# Patient Record
Sex: MALE | Race: OTHER | HISPANIC OR LATINO | Employment: UNEMPLOYED | URBAN - METROPOLITAN AREA
[De-identification: names, ages, dates, MRNs, and addresses within clinical notes are randomized per-mention and may not be internally consistent; named-entity substitution may affect disease eponyms.]

---

## 2023-03-29 ENCOUNTER — HOSPITAL ENCOUNTER (EMERGENCY)
Facility: HOSPITAL | Age: 33
Discharge: HOME | End: 2023-03-29
Attending: STUDENT IN AN ORGANIZED HEALTH CARE EDUCATION/TRAINING PROGRAM

## 2023-03-29 VITALS
TEMPERATURE: 98 F | HEIGHT: 65 IN | BODY MASS INDEX: 23.88 KG/M2 | WEIGHT: 143.31 LBS | HEART RATE: 67 BPM | DIASTOLIC BLOOD PRESSURE: 92 MMHG | RESPIRATION RATE: 20 BRPM | OXYGEN SATURATION: 100 % | SYSTOLIC BLOOD PRESSURE: 169 MMHG
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
## 2023-03-29 DIAGNOSIS — S20.211A RIB CONTUSION, RIGHT, INITIAL ENCOUNTER: Primary | ICD-10-CM

## 2023-03-29 LAB
ANION GAP SERPL CALC-SCNC: 7 MEQ/L
BASOPHILS # BLD AUTO: 0.05 X10(3)/MCL (ref 0–0.2)
BASOPHILS NFR BLD AUTO: 0.5 %
BUN SERPL-MCNC: 17.6 MG/DL (ref 8.9–20.6)
CALCIUM SERPL-MCNC: 9.8 MG/DL (ref 8.4–10.2)
CHLORIDE SERPL-SCNC: 108 MMOL/L (ref 98–107)
CO2 SERPL-SCNC: 26 MMOL/L (ref 22–29)
CREAT SERPL-MCNC: 0.88 MG/DL (ref 0.73–1.18)
CREAT/UREA NIT SERPL: 20
EOSINOPHIL # BLD AUTO: 0.12 X10(3)/MCL (ref 0–0.9)
EOSINOPHIL NFR BLD AUTO: 1.3 %
ERYTHROCYTE [DISTWIDTH] IN BLOOD BY AUTOMATED COUNT: 13.3 % (ref 11.5–17)
GFR SERPLBLD CREATININE-BSD FMLA CKD-EPI: >60 MLS/MIN/1.73/M2
GLUCOSE SERPL-MCNC: 92 MG/DL (ref 74–100)
HCT VFR BLD AUTO: 45.9 % (ref 42–52)
HGB BLD-MCNC: 15.3 G/DL (ref 14–18)
IMM GRANULOCYTES # BLD AUTO: 0.03 X10(3)/MCL (ref 0–0.04)
IMM GRANULOCYTES NFR BLD AUTO: 0.3 %
LYMPHOCYTES # BLD AUTO: 3.05 X10(3)/MCL (ref 0.6–4.6)
LYMPHOCYTES NFR BLD AUTO: 32.6 %
MCH RBC QN AUTO: 29.5 PG (ref 27–31)
MCHC RBC AUTO-ENTMCNC: 33.3 G/DL (ref 33–36)
MCV RBC AUTO: 88.4 FL (ref 80–94)
MONOCYTES # BLD AUTO: 0.62 X10(3)/MCL (ref 0.1–1.3)
MONOCYTES NFR BLD AUTO: 6.6 %
NEUTROPHILS # BLD AUTO: 5.48 X10(3)/MCL (ref 2.1–9.2)
NEUTROPHILS NFR BLD AUTO: 58.7 %
NRBC BLD AUTO-RTO: 0 %
PLATELET # BLD AUTO: 317 X10(3)/MCL (ref 130–400)
PMV BLD AUTO: 9.3 FL (ref 7.4–10.4)
POTASSIUM SERPL-SCNC: 4.2 MMOL/L (ref 3.5–5.1)
RBC # BLD AUTO: 5.19 X10(6)/MCL (ref 4.7–6.1)
SODIUM SERPL-SCNC: 141 MMOL/L (ref 136–145)
WBC # SPEC AUTO: 9.4 X10(3)/MCL (ref 4.5–11.5)

## 2023-03-29 PROCEDURE — 63600175 PHARM REV CODE 636 W HCPCS: Performed by: PHYSICIAN ASSISTANT

## 2023-03-29 PROCEDURE — 96372 THER/PROPH/DIAG INJ SC/IM: CPT | Performed by: PHYSICIAN ASSISTANT

## 2023-03-29 PROCEDURE — 99284 EMERGENCY DEPT VISIT MOD MDM: CPT | Mod: 25

## 2023-03-29 PROCEDURE — 25000003 PHARM REV CODE 250: Performed by: PHYSICIAN ASSISTANT

## 2023-03-29 PROCEDURE — 80048 BASIC METABOLIC PNL TOTAL CA: CPT | Performed by: PHYSICIAN ASSISTANT

## 2023-03-29 PROCEDURE — 85025 COMPLETE CBC W/AUTO DIFF WBC: CPT | Performed by: PHYSICIAN ASSISTANT

## 2023-03-29 RX ORDER — HYDROCODONE BITARTRATE AND ACETAMINOPHEN 5; 325 MG/1; MG/1
1 TABLET ORAL ONCE
Status: COMPLETED | OUTPATIENT
Start: 2023-03-29 | End: 2023-03-29

## 2023-03-29 RX ORDER — INDOMETHACIN 25 MG/1
25 CAPSULE ORAL
Qty: 15 CAPSULE | Refills: 0 | Status: SHIPPED | OUTPATIENT
Start: 2023-03-29 | End: 2023-04-03

## 2023-03-29 RX ORDER — KETOROLAC TROMETHAMINE 30 MG/ML
30 INJECTION, SOLUTION INTRAMUSCULAR; INTRAVENOUS
Status: COMPLETED | OUTPATIENT
Start: 2023-03-29 | End: 2023-03-29

## 2023-03-29 RX ORDER — METHOCARBAMOL 500 MG/1
500 TABLET, FILM COATED ORAL 3 TIMES DAILY
Qty: 15 TABLET | Refills: 0 | Status: SHIPPED | OUTPATIENT
Start: 2023-03-29 | End: 2023-04-03

## 2023-03-29 RX ADMIN — KETOROLAC TROMETHAMINE 30 MG: 30 INJECTION, SOLUTION INTRAMUSCULAR at 03:03

## 2023-03-29 RX ADMIN — HYDROCODONE BITARTRATE AND ACETAMINOPHEN 1 TABLET: 5; 325 TABLET ORAL at 03:03

## 2023-03-29 NOTE — ED PROVIDER NOTES
Encounter Date: 3/29/2023       History     Chief Complaint   Patient presents with    Rib Injury     Pain to R ribs after falling while working on Saturday     Patient reports to the ER with complaints of right sided rib pain after he fell onto a wood board from a standing position; pt denies LOC or striking any other object    The history is provided by the patient.   Fall  The accident occurred two days ago. The fall occurred while standing. He fell from a height of 1 to 2 ft. 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 The pain is at a severity of 5/10. He was Ambulatory at the scene. There was No entrapment after the fall. There was No alcohol use involved in the accident. Pertinent negatives include no neck pain, no back pain, no paralysis, no fever, no numbness, no abdominal pain, no nausea, no hematuria, no headaches, no hearing loss and no loss of consciousness. He has tried rest for the symptoms. The treatment provided mild relief.   Review of patient's allergies indicates:  No Known Allergies  History reviewed. No pertinent past medical history.  History reviewed. No pertinent surgical history.  History reviewed. No pertinent family history.  Social History     Tobacco Use    Smoking status: Never    Smokeless tobacco: Never   Substance Use Topics    Alcohol use: Never    Drug use: Never     Review of Systems   Constitutional:  Negative for fever.   HENT:  Negative for sore throat.    Respiratory:  Negative for shortness of breath.    Cardiovascular:  Negative for leg swelling.   Gastrointestinal:  Negative for abdominal pain and nausea.   Genitourinary:  Negative for dysuria and hematuria.   Musculoskeletal:  Negative for back pain and neck pain.   Skin:  Negative for rash.   Neurological:  Negative for loss of consciousness, weakness, numbness and headaches.   Hematological:  Does not bruise/bleed easily.   Psychiatric/Behavioral: Negative.       Physical Exam     Initial Vitals [03/29/23 1343]    BP Pulse Resp Temp SpO2   (!) 168/90 60 17 98.1 °F (36.7 °C) 100 %      MAP       --         Physical Exam    Vitals reviewed.  Constitutional: He appears well-developed.   HENT:   Head: Normocephalic and atraumatic.   Eyes: Conjunctivae and EOM are normal. Pupils are equal, round, and reactive to light.   Neck:   Normal range of motion.  Cardiovascular:  Normal rate, regular rhythm and normal heart sounds.           Pulmonary/Chest: Breath sounds normal. No respiratory distress. He has no wheezes. He exhibits tenderness.     Abdominal: Abdomen is soft. Bowel sounds are normal. He exhibits no distension. There is no abdominal tenderness.   Musculoskeletal:         General: Normal range of motion.      Cervical back: Normal range of motion.     Neurological: He is alert and oriented to person, place, and time. He displays normal reflexes. No cranial nerve deficit or sensory deficit. GCS score is 15. GCS eye subscore is 4. GCS verbal subscore is 5. GCS motor subscore is 6.   Skin: Skin is warm. No pallor.   Psychiatric: He has a normal mood and affect. His behavior is normal. Judgment and thought content normal.       ED Course   Procedures  Labs Reviewed   BASIC METABOLIC PANEL - Abnormal; Notable for the following components:       Result Value    Chloride 108 (*)     All other components within normal limits   CBC W/ AUTO DIFFERENTIAL    Narrative:     The following orders were created for panel order CBC auto differential.  Procedure                               Abnormality         Status                     ---------                               -----------         ------                     CBC with Differential[[id number]]                            Final result                 Please view results for these tests on the individual orders.   CBC WITH DIFFERENTIAL   EXTRA TUBES    Narrative:     The following orders were created for panel order EXTRA TUBES.  Procedure                               Abnormality          Status                     ---------                               -----------         ------                     Light Blue Top Hold[[id number]]                              In process                 Gold Top Hold[[id number]]                                    In process                   Please view results for these tests on the individual orders.   LIGHT BLUE TOP HOLD   GOLD TOP HOLD          Imaging Results              X-Ray Chest PA And Lateral (Final result)  Result time 03/29/23 14:55:02      Final result by Sonu Hua MD (03/29/23 14:55:02)                   Impression:      No acute chest disease is identified.      Electronically signed by: Sonu Hua  Date:    03/29/2023  Time:    14:55               Narrative:    EXAMINATION:  XR CHEST PA AND LATERAL    CLINICAL HISTORY:  , Unspecified fall, initial encounter.    FINDINGS:  No alveolar consolidation, effusion, or pneumothorax is seen.   The thoracic aorta is normal  cardiac silhouette, central pulmonary vessels and mediastinum are normal in size and are grossly unremarkable.   visualized osseous structures are grossly unremarkable.                                       X-Ray Ribs 2 View Right (Final result)  Result time 03/29/23 15:14:17      Final result by Jackie Hines MD (03/29/23 15:14:17)                   Impression:      No displaced rib fracture identified.      Electronically signed by: Jackie Hines  Date:    03/29/2023  Time:    15:14               Narrative:    EXAMINATION:  XR RIBS 2 VIEW RIGHT    CLINICAL HISTORY:  Unspecified fall, initial encounter    COMPARISON:  Chest x-ray dated 03/29/2023    FINDINGS:  There is no displaced rib fracture identified.  The right lung is clear without pleural effusion or visible pneumothorax.                                       Medications   ketorolac injection 30 mg (30 mg Intramuscular Given 3/29/23 1500)   HYDROcodone-acetaminophen 5-325 mg per tablet 1 tablet (1  tablet Oral Given 3/29/23 1500)           APC / Resident Notes:   I was not physically present during the history, exam or disposition of this patient.  I was available all times for consultation. (Jonathon)                    Clinical Impression:   Final diagnoses:  [W19.XXXA] Fall  [W19.XXXA] Fall - onto right ribs  [S20.211A] Rib contusion, right, initial encounter (Primary)        ED Disposition Condition    Discharge Stable          ED Prescriptions       Medication Sig Dispense Start Date End Date Auth. Provider    methocarbamoL (ROBAXIN) 500 MG Tab Take 1 tablet (500 mg total) by mouth 3 (three) times daily. for 5 days 15 tablet 3/29/2023 4/3/2023 MELLY Montes    indomethacin (INDOCIN) 25 MG capsule Take 1 capsule (25 mg total) by mouth 3 (three) times daily with meals. for 5 days 15 capsule 3/29/2023 4/3/2023 MELLY Montes          Follow-up Information       Follow up With Specialties Details Why Contact Info    discharge followup    If your symptoms become WORSE or you DO NOT IMPROVE and you are unable to reach your health care provider, you should RETURN to the emergency department    discharge info    Discussed all pertinent ED information, results, diagnosis and treatment plan; All questions and concerns were addressed at this time. Patient voices understanding of information and instructions. Patient is comfortable with plan and discharge             MELLY Montes  03/29/23 5606       Aman Holt MD  03/30/23 8679

## 2025-03-08 ENCOUNTER — HOSPITAL ENCOUNTER (EMERGENCY)
Facility: HOSPITAL | Age: 35
Discharge: HOME | End: 2025-03-08
Attending: EMERGENCY MEDICINE

## 2025-03-08 VITALS
OXYGEN SATURATION: 98 % | HEIGHT: 66 IN | DIASTOLIC BLOOD PRESSURE: 79 MMHG | TEMPERATURE: 99 F | BODY MASS INDEX: 27 KG/M2 | RESPIRATION RATE: 14 BRPM | SYSTOLIC BLOOD PRESSURE: 125 MMHG | HEART RATE: 77 BPM | WEIGHT: 168 LBS

## 2025-03-08 DIAGNOSIS — R05.9 COUGH: ICD-10-CM

## 2025-03-08 DIAGNOSIS — J10.1 INFLUENZA A: Primary | ICD-10-CM

## 2025-03-08 LAB
FLUAV AG UPPER RESP QL IA.RAPID: DETECTED
FLUBV AG UPPER RESP QL IA.RAPID: NOT DETECTED
RSV A 5' UTR RNA NPH QL NAA+PROBE: NOT DETECTED
SARS-COV-2 RNA RESP QL NAA+PROBE: NOT DETECTED
STREP A PCR (OHS): NOT DETECTED

## 2025-03-08 PROCEDURE — 87651 STREP A DNA AMP PROBE: CPT

## 2025-03-08 PROCEDURE — 25000003 PHARM REV CODE 250

## 2025-03-08 PROCEDURE — 99283 EMERGENCY DEPT VISIT LOW MDM: CPT | Mod: 25

## 2025-03-08 PROCEDURE — 0241U COVID/RSV/FLU A&B PCR: CPT

## 2025-03-08 RX ORDER — FLUTICASONE PROPIONATE 50 MCG
1 SPRAY, SUSPENSION (ML) NASAL 2 TIMES DAILY PRN
Qty: 15 G | Refills: 0 | Status: SHIPPED | OUTPATIENT
Start: 2025-03-08

## 2025-03-08 RX ORDER — ACETAMINOPHEN 325 MG/1
650 TABLET ORAL
Status: COMPLETED | OUTPATIENT
Start: 2025-03-08 | End: 2025-03-08

## 2025-03-08 RX ORDER — LORATADINE 10 MG/1
10 TABLET ORAL DAILY PRN
Qty: 20 TABLET | Refills: 0 | Status: SHIPPED | OUTPATIENT
Start: 2025-03-08

## 2025-03-08 RX ORDER — BENZONATATE 100 MG/1
100 CAPSULE ORAL 3 TIMES DAILY PRN
Qty: 15 CAPSULE | Refills: 0 | Status: SHIPPED | OUTPATIENT
Start: 2025-03-08

## 2025-03-08 RX ORDER — IBUPROFEN 600 MG/1
600 TABLET ORAL EVERY 6 HOURS PRN
Qty: 20 TABLET | Refills: 0 | Status: SHIPPED | OUTPATIENT
Start: 2025-03-08

## 2025-03-08 RX ADMIN — ACETAMINOPHEN 650 MG: 325 TABLET, FILM COATED ORAL at 09:03

## 2025-03-08 NOTE — ED PROVIDER NOTES
Encounter Date: 3/8/2025       History     Chief Complaint   Patient presents with    Generalized Body Aches     Generalized body aches, sore throat, chills, productive cough x 3 days.      35 y.o. male presents to Emergency Department with a chief complaint of cough. Symptoms began 3-4 days ago and have been constant since onset. Associated symptoms include subjective fever, body aches, chills, congestion, and sore throat. Symptoms are aggravated with coughing and there are no alleviating factors. The patient denies CP, SOB, vomiting, or abdominal pain. No other reported symptoms at this time      The history is provided by the patient and a relative. A  was used.   Cough  This is a new problem. The current episode started several days ago. The problem occurs every few minutes. The problem has been unchanged. The cough is Productive of sputum. The fever has been present for 3 to 4 days. Associated symptoms include chills, sore throat and myalgias. Pertinent negatives include no chest pain, no sweats, no headaches, no shortness of breath and no wheezing. He has tried nothing for the symptoms.     Review of patient's allergies indicates:  No Known Allergies  History reviewed. No pertinent past medical history.  History reviewed. No pertinent surgical history.  No family history on file.  Social History[1]  Review of Systems   Constitutional:  Positive for chills and fever. Negative for diaphoresis and fatigue.   HENT:  Positive for congestion and sore throat. Negative for facial swelling, hearing loss, mouth sores, trouble swallowing and voice change.    Eyes:  Negative for photophobia and visual disturbance.   Respiratory:  Positive for cough. Negative for shortness of breath, wheezing and stridor.    Cardiovascular:  Negative for chest pain and leg swelling.   Gastrointestinal:  Negative for abdominal pain, diarrhea, nausea and vomiting.   Musculoskeletal:  Positive for myalgias. Negative for  back pain, gait problem and joint swelling.   Neurological:  Negative for dizziness, seizures, syncope, weakness, numbness and headaches.   All other systems reviewed and are negative.      Physical Exam     Initial Vitals [03/08/25 0940]   BP Pulse Resp Temp SpO2   131/82 78 15 99.8 °F (37.7 °C) 97 %      MAP       --         Physical Exam    Nursing note and vitals reviewed.  Constitutional: He appears well-developed and well-nourished. He is not diaphoretic. He is cooperative.  Non-toxic appearance. No distress.   HENT:   Head: Normocephalic and atraumatic.   Right Ear: Hearing, tympanic membrane, external ear and ear canal normal. No tenderness. Tympanic membrane is not erythematous.   Left Ear: Hearing, tympanic membrane, external ear and ear canal normal. No tenderness. Tympanic membrane is not erythematous.   Nose: Nose normal. Mouth/Throat: Uvula is midline. Posterior oropharyngeal erythema present. No oropharyngeal exudate or tonsillar abscesses.   1 + symmetric tonsils noted.   Eyes: Conjunctivae and EOM are normal. Pupils are equal, round, and reactive to light.   Neck: Neck supple.   Normal range of motion.  Cardiovascular:  Normal rate, regular rhythm, S1 normal, S2 normal, normal heart sounds, intact distal pulses and normal pulses.           Pulmonary/Chest: Effort normal and breath sounds normal. No accessory muscle usage. No tachypnea and no bradypnea. No respiratory distress. He has no decreased breath sounds. He has no wheezes. He has no rhonchi. He has no rales. He exhibits no tenderness.   In no respiratory distress, able to speak in complete sentences.    Abdominal: Abdomen is soft. Bowel sounds are normal. He exhibits no distension. There is no abdominal tenderness. There is no rebound.   Musculoskeletal:         General: Tenderness present. Normal range of motion.      Cervical back: Normal range of motion and neck supple.      Comments: C/o generalized body aches.      Neurological: He is  alert and oriented to person, place, and time. He has normal strength and normal reflexes. No sensory deficit. GCS score is 15. GCS eye subscore is 4. GCS verbal subscore is 5. GCS motor subscore is 6.   Skin: Skin is warm and dry. Capillary refill takes less than 2 seconds.   Psychiatric: He has a normal mood and affect. Thought content normal.         ED Course   Procedures  Labs Reviewed   COVID/RSV/FLU A&B PCR - Abnormal       Result Value    Influenza A PCR Detected (*)     Influenza B PCR Not Detected      Respiratory Syncytial Virus PCR Not Detected      SARS-CoV-2 PCR Not Detected      Narrative:     The Xpert Xpress SARS-CoV-2/FLU/RSV plus is a rapid, multiplexed real-time PCR test intended for the simultaneous qualitative detection and differentiation of SARS-CoV-2, Influenza A, Influenza B, and respiratory syncytial virus (RSV) viral RNA in either nasopharyngeal swab or nasal swab specimens.         STREP GROUP A BY PCR - Normal    STREP A PCR ([facility]) Not Detected      Narrative:     The Xpert Xpress Strep A test is a rapid, qualitative in vitro diagnostic test for the detection of Streptococcus pyogenes (Group A ß-hemolytic Streptococcus, Strep A) in throat swab specimens from patients with signs and symptoms of pharyngitis.            Imaging Results              X-Ray Chest PA And Lateral (Final result)  Result time 03/08/25 10:09:39      Final result by Emir Meléndez MD (03/08/25 10:09:39)                   Impression:      No acute cardiopulmonary process identified.      Electronically signed by: Emir Meléndez  Date:    03/08/2025  Time:    10:09               Narrative:    EXAMINATION:  XR CHEST PA AND LATERAL    CLINICAL HISTORY:  Cough, unspecified    TECHNIQUE:  Two-view    COMPARISON:  March 29, 2023.    FINDINGS:  Cardiopericardial silhouette is within normal limits. Lungs are without dense focal or segmental consolidation, congestion, pleural effusion or pneumothorax.                                        Medications   acetaminophen tablet 650 mg (650 mg Oral Given 3/8/25 0956)     Medical Decision Making  Patient awake, alert, has non-labored breathing, and follows commands appropriately. Arrived to ED due to cough, fever, body aches, sore throat, and chills. Symptoms began several days ago. Low grade temp noted in triage. NAD Noted.    Judging by the patient's chief complaint and pertinent history, the patient has the following possible differential diagnoses, including but not limited to the following: COVID, Flu, Viral Syndrome, Strep, Bronchitis     Some of these are deemed to be lower likelihood and some more likely based on my physical exam and history combined with possible lab work and/or imaging studies. Please see the pertinent studies, and refer to the HPI. Some of these diagnoses will take further evaluation to fully rule out, perhaps as an outpatient and the patient was encouraged to follow up when discharged for more comprehensive evaluation.       Amount and/or Complexity of Data Reviewed  Labs: ordered. Decision-making details documented in ED Course.     Details: Flu +. Informed patient and family of results.   Radiology: ordered. Decision-making details documented in ED Course.     Details: Informed patient and family of results.   Discussion of management or test interpretation with external provider(s): Discussed plan of care and interventions with patient. Agreed to and aware of plan of care. Comfortable being discharged home. Patient discharged home. Patient denies new or additional complaints; no further tests indicated at this time. Verbalized understanding of instructions. No emergent or apparent distress noted prior to discharge.. Strict ER return precautions given.       Risk  OTC drugs.  Prescription drug management.               ED Course as of 03/08/25 1115   Sat Mar 08, 2025   1012 X-Ray Chest PA And Lateral  Cardiopericardial silhouette is within normal limits. Lungs  are without dense focal or segmental consolidation, congestion, pleural effusion or pneumothorax. [JA]   1030 STREP A PCR ([facility]): Not Detected [JA]   1046 Influenza A, Molecular(!): Detected [JA]   1046 Influenza B, Molecular: Not Detected [JA]   1046 RSV Ag by Molecular Method: Not Detected [JA]   1046 SARS-CoV2 (COVID-19) Qualitative PCR: Not Detected [JA]   1057 Discussed results with patient and family. Flu +. Symptoms began several days ago, therefore, Tamiflu not currently indicated. Will treat symptoms with medication. At disposition, patient has no additional complaints, VSS, has no signs of systemic infection, and no respiratory distress. Stable for dc home.  [JA]      ED Course User Index  [JA] Julia Stringer NP                           Clinical Impression:  Final diagnoses:  [R05.9] Cough  [J10.1] Influenza A (Primary)          ED Disposition Condition    Discharge Stable          ED Prescriptions       Medication Sig Dispense Start Date End Date Auth. Provider    ibuprofen (ADVIL,MOTRIN) 600 MG tablet Take 1 tablet (600 mg total) by mouth every 6 (six) hours as needed for Pain. 20 tablet 3/8/2025 -- Julia Stringer NP    benzonatate (TESSALON) 100 MG capsule Take 1 capsule (100 mg total) by mouth 3 (three) times daily as needed for Cough. 15 capsule 3/8/2025 -- Julia Stringer NP    fluticasone propionate (FLONASE) 50 mcg/actuation nasal spray 1 spray (50 mcg total) by Each Nostril route 2 (two) times daily as needed for Rhinitis or Allergies. 15 g 3/8/2025 -- Julia Stringer NP    loratadine (CLARITIN) 10 mg tablet Take 1 tablet (10 mg total) by mouth daily as needed for Allergies. 20 tablet 3/8/2025 -- Julia Stringer NP          Follow-up Information       Follow up With Specialties Details Why Contact Info    PCP  Schedule an appointment as soon as possible for a visit in 2 days      [facility] Emergency Medicine Go to  If symptoms worsen, As  needed [address] [facility] [address]  [phone]               [1]   Social History  Tobacco Use    Smoking status: Never    Smokeless tobacco: Never   Substance Use Topics    Alcohol use: Never    Drug use: Never        Julia Stringer, NP  03/08/25 1120

## 2025-03-08 NOTE — DISCHARGE INSTRUCTIONS
¡Fariba por permitirnos cuidarlo hoy! Nuestro objetivo es brindarle geoffrey atención len y mantenerlo cómodo e informado. Si tiene alguna pregunta antes de irse, estaré encantado de intentar responderla.    A continuación, le ofrecemos algunos consejos para después de rainey visita:    Rainey visita al departamento de emergencias NO es geoffrey atención definitiva. Realice un seguimiento con rainey médico de atención primaria o especialista dentro de 1 semana. Vuelva si nota algún empeoramiento de rainey condición o si tiene alguna otra inquietud.    Si le realizaron exámenes de radiología norberto geoffrey radiografía o geoffrey tomografía computarizada en el departamento de emergencias, la interpretación de los mismos puede ser preliminar. Es posible que haya hallazgos menos urgentes en los informes. Obtenga estos informes dentro de las 24 horas en el hospital o usando rainey teléfono móvil para acceder a los registros. Llévelos a rainey médico de atención primaria o especialista para geoffrey revisión adicional de los hallazgos incidentales.    Revise cualquier ANÁLISIS DE LABORATORIO de rainey visita de horamses con rainey médico de atención primaria.

## 2025-04-12 PROBLEM — F17.200 TOBACCO DEPENDENCY: Status: ACTIVE | Noted: 2025-04-12

## 2025-04-12 PROBLEM — S37.009A KIDNEY INJURY: Status: ACTIVE | Noted: 2025-04-12

## 2025-04-15 ENCOUNTER — HOSPITAL ENCOUNTER (EMERGENCY)
Facility: HOSPITAL | Age: 35
Discharge: HOME | End: 2025-04-15
Attending: STUDENT IN AN ORGANIZED HEALTH CARE EDUCATION/TRAINING PROGRAM

## 2025-04-15 VITALS
HEIGHT: 66 IN | OXYGEN SATURATION: 99 % | TEMPERATURE: 98 F | BODY MASS INDEX: 27 KG/M2 | SYSTOLIC BLOOD PRESSURE: 134 MMHG | RESPIRATION RATE: 20 BRPM | HEART RATE: 54 BPM | WEIGHT: 168 LBS | DIASTOLIC BLOOD PRESSURE: 80 MMHG

## 2025-04-15 DIAGNOSIS — R10.9 SIDE PAIN: ICD-10-CM

## 2025-04-15 DIAGNOSIS — R06.02 SHORTNESS OF BREATH: ICD-10-CM

## 2025-04-15 DIAGNOSIS — R50.9 FEVER, UNSPECIFIED FEVER CAUSE: Primary | ICD-10-CM

## 2025-04-15 PROBLEM — S37.031D: Status: ACTIVE | Noted: 2025-04-15

## 2025-04-15 LAB
ALBUMIN SERPL-MCNC: 4.4 G/DL (ref 3.5–5)
ALBUMIN/GLOB SERPL: 1.3 RATIO (ref 1.1–2)
ALP SERPL-CCNC: 82 UNIT/L (ref 40–150)
ALT SERPL-CCNC: 27 UNIT/L (ref 0–55)
ANION GAP SERPL CALC-SCNC: 6 MEQ/L
AST SERPL-CCNC: 18 UNIT/L (ref 11–45)
BACTERIA #/AREA URNS AUTO: ABNORMAL /HPF
BASOPHILS # BLD AUTO: 0.05 X10(3)/MCL
BASOPHILS NFR BLD AUTO: 0.4 %
BILIRUB SERPL-MCNC: 0.3 MG/DL
BILIRUB UR QL STRIP.AUTO: NEGATIVE
BNP BLD-MCNC: <10 PG/ML
BUN SERPL-MCNC: 15.3 MG/DL (ref 8.9–20.6)
CALCIUM SERPL-MCNC: 9.3 MG/DL (ref 8.4–10.2)
CHLORIDE SERPL-SCNC: 105 MMOL/L (ref 98–107)
CLARITY UR: CLEAR
CO2 SERPL-SCNC: 24 MMOL/L (ref 22–29)
COLOR UR AUTO: ABNORMAL
CREAT SERPL-MCNC: 0.8 MG/DL (ref 0.72–1.25)
CREAT/UREA NIT SERPL: 19
EOSINOPHIL # BLD AUTO: 0.04 X10(3)/MCL (ref 0–0.9)
EOSINOPHIL NFR BLD AUTO: 0.3 %
ERYTHROCYTE [DISTWIDTH] IN BLOOD BY AUTOMATED COUNT: 13.9 % (ref 11.5–17)
FLUAV AG UPPER RESP QL IA.RAPID: NOT DETECTED
FLUBV AG UPPER RESP QL IA.RAPID: NOT DETECTED
GFR SERPLBLD CREATININE-BSD FMLA CKD-EPI: >60 ML/MIN/1.73/M2
GLOBULIN SER-MCNC: 3.4 GM/DL (ref 2.4–3.5)
GLUCOSE SERPL-MCNC: 91 MG/DL (ref 74–100)
GLUCOSE UR QL STRIP: NORMAL
HCT VFR BLD AUTO: 46.3 % (ref 42–52)
HGB BLD-MCNC: 15.1 G/DL (ref 14–18)
HGB UR QL STRIP: ABNORMAL
IMM GRANULOCYTES # BLD AUTO: 0.03 X10(3)/MCL (ref 0–0.04)
IMM GRANULOCYTES NFR BLD AUTO: 0.2 %
KETONES UR QL STRIP: NEGATIVE
LEUKOCYTE ESTERASE UR QL STRIP: NEGATIVE
LYMPHOCYTES # BLD AUTO: 2.09 X10(3)/MCL (ref 0.6–4.6)
LYMPHOCYTES NFR BLD AUTO: 17 %
MCH RBC QN AUTO: 29.2 PG (ref 27–31)
MCHC RBC AUTO-ENTMCNC: 32.6 G/DL (ref 33–36)
MCV RBC AUTO: 89.4 FL (ref 80–94)
MONOCYTES # BLD AUTO: 0.93 X10(3)/MCL (ref 0.1–1.3)
MONOCYTES NFR BLD AUTO: 7.6 %
MUCOUS THREADS URNS QL MICRO: ABNORMAL /LPF
NEUTROPHILS # BLD AUTO: 9.17 X10(3)/MCL (ref 2.1–9.2)
NEUTROPHILS NFR BLD AUTO: 74.5 %
NITRITE UR QL STRIP: NEGATIVE
NRBC BLD AUTO-RTO: 0 %
OHS QRS DURATION: 90 MS
OHS QTC CALCULATION: 385 MS
PH UR STRIP: 7.5 [PH]
PLATELET # BLD AUTO: 278 X10(3)/MCL (ref 130–400)
PMV BLD AUTO: 9.2 FL (ref 7.4–10.4)
POTASSIUM SERPL-SCNC: 4.5 MMOL/L (ref 3.5–5.1)
PROT SERPL-MCNC: 7.8 GM/DL (ref 6.4–8.3)
PROT UR QL STRIP: NEGATIVE
RBC # BLD AUTO: 5.18 X10(6)/MCL (ref 4.7–6.1)
RBC #/AREA URNS AUTO: ABNORMAL /HPF
RSV A 5' UTR RNA NPH QL NAA+PROBE: NOT DETECTED
SARS-COV-2 RNA RESP QL NAA+PROBE: NOT DETECTED
SODIUM SERPL-SCNC: 135 MMOL/L (ref 136–145)
SP GR UR STRIP.AUTO: 1.02 (ref 1–1.03)
SQUAMOUS #/AREA URNS LPF: ABNORMAL /HPF
TROPONIN I SERPL-MCNC: <0.01 NG/ML (ref 0–0.04)
UROBILINOGEN UR STRIP-ACNC: NORMAL
WBC # BLD AUTO: 12.31 X10(3)/MCL (ref 4.5–11.5)
WBC #/AREA URNS AUTO: ABNORMAL /HPF

## 2025-04-15 PROCEDURE — 81001 URINALYSIS AUTO W/SCOPE: CPT

## 2025-04-15 PROCEDURE — 96375 TX/PRO/DX INJ NEW DRUG ADDON: CPT

## 2025-04-15 PROCEDURE — 84484 ASSAY OF TROPONIN QUANT: CPT

## 2025-04-15 PROCEDURE — 99285 EMERGENCY DEPT VISIT HI MDM: CPT | Mod: 25

## 2025-04-15 PROCEDURE — 99282 EMERGENCY DEPT VISIT SF MDM: CPT | Mod: ,,,

## 2025-04-15 PROCEDURE — 93010 ELECTROCARDIOGRAM REPORT: CPT | Mod: ,,, | Performed by: INTERNAL MEDICINE

## 2025-04-15 PROCEDURE — 83880 ASSAY OF NATRIURETIC PEPTIDE: CPT

## 2025-04-15 PROCEDURE — 93005 ELECTROCARDIOGRAM TRACING: CPT

## 2025-04-15 PROCEDURE — 80053 COMPREHEN METABOLIC PANEL: CPT

## 2025-04-15 PROCEDURE — 25500020 PHARM REV CODE 255: Performed by: PHYSICIAN ASSISTANT

## 2025-04-15 PROCEDURE — 96374 THER/PROPH/DIAG INJ IV PUSH: CPT

## 2025-04-15 PROCEDURE — 0241U COVID/RSV/FLU A&B PCR: CPT | Performed by: PHYSICIAN ASSISTANT

## 2025-04-15 PROCEDURE — 85025 COMPLETE CBC W/AUTO DIFF WBC: CPT

## 2025-04-15 PROCEDURE — 63600175 PHARM REV CODE 636 W HCPCS: Performed by: PHYSICIAN ASSISTANT

## 2025-04-15 RX ORDER — HYDROCODONE BITARTRATE AND ACETAMINOPHEN 10; 325 MG/1; MG/1
1 TABLET ORAL EVERY 6 HOURS PRN
Qty: 12 TABLET | Refills: 0 | Status: SHIPPED | OUTPATIENT
Start: 2025-04-15

## 2025-04-15 RX ORDER — MORPHINE SULFATE 4 MG/ML
4 INJECTION, SOLUTION INTRAMUSCULAR; INTRAVENOUS
Refills: 0 | Status: COMPLETED | OUTPATIENT
Start: 2025-04-15 | End: 2025-04-15

## 2025-04-15 RX ORDER — ONDANSETRON HYDROCHLORIDE 2 MG/ML
4 INJECTION, SOLUTION INTRAVENOUS
Status: COMPLETED | OUTPATIENT
Start: 2025-04-15 | End: 2025-04-15

## 2025-04-15 RX ADMIN — ONDANSETRON 4 MG: 2 INJECTION INTRAMUSCULAR; INTRAVENOUS at 04:04

## 2025-04-15 RX ADMIN — MORPHINE SULFATE 4 MG: 4 INJECTION INTRAVENOUS at 04:04

## 2025-04-15 RX ADMIN — IOHEXOL 100 ML: 350 INJECTION, SOLUTION INTRAVENOUS at 04:04

## 2025-04-15 NOTE — FIRST PROVIDER EVALUATION
"Medical screening examination initiated.  I have conducted a focused provider triage encounter, findings are as follows:    Brief history of present illness:  arrived to ED due to fever. States he was seen Friday in ER after a fall while working. States he may have had blood in kidneys and lungs. C/o SOB.     Vitals:    04/15/25 1250 04/15/25 1251   BP:  129/81   Pulse:  65   Resp:  20   Temp:  99 °F (37.2 °C)   TempSrc:  Oral   SpO2:  98%   Weight: 76.2 kg (168 lb)    Height: 5' 6" (1.676 m)        Pertinent physical exam:  awake, alert, has non-labored breathing, ambulatory into triage    Brief workup plan:  labs, EKG, imaging    Preliminary workup initiated; this workup will be continued and followed by the physician or advanced practice provider that is assigned to the patient when roomed.  "

## 2025-04-15 NOTE — Clinical Note
"Poncho Calabrese"Parvez Taylor was seen and treated in our emergency department on 4/15/2025.  He may return to work on 04/22/2025.  Please excuse for up to 48hours prior for symptoms present at that time if possible. Patient may also return sooner than above date if symptoms improve/resolve.       If you have any questions or concerns, please don't hesitate to call.      Darlene Perez PA"

## 2025-04-15 NOTE — ED PROVIDER NOTES
Encounter Date: 4/15/2025       History     Chief Complaint   Patient presents with    Fever     Pt ambulatory to triage and presents via POV. Endorses fever that began last night (100.4) w/ associated nonproductive cough and SOB. Seen here Friday after fall at work, and diagnosed with pulmonary contusion.      See [facility] for details.      The history is provided by the patient. The history is limited by a language barrier. A  was used.     Review of patient's allergies indicates:  No Known Allergies  No past medical history on file.  No past surgical history on file.  No family history on file.  Social History[1]  Review of Systems   Constitutional:  Positive for fever. Negative for activity change, appetite change, diaphoresis and fatigue.   HENT:  Negative for rhinorrhea and sinus pressure.    Eyes: Negative.    Respiratory: Negative.  Negative for chest tightness.    Cardiovascular:  Negative for chest pain.   Gastrointestinal: Negative.  Negative for abdominal distention and abdominal pain.   Endocrine: Negative.    Genitourinary: Negative.    Musculoskeletal: Negative.  Negative for arthralgias.   Allergic/Immunologic: Negative.    Neurological:  Negative for dizziness and headaches.   Hematological: Negative.    Psychiatric/Behavioral: Negative.     All other systems reviewed and are negative.      Physical Exam     Initial Vitals [04/15/25 1251]   BP Pulse Resp Temp SpO2   129/81 65 20 99 °F (37.2 °C) 98 %      MAP       --         Physical Exam    Nursing note and vitals reviewed.  Constitutional: He appears well-developed and well-nourished. He is cooperative. No distress.   HENT:   Head: Normocephalic and atraumatic. Not macrocephalic.   Right Ear: Tympanic membrane and external ear normal. Tympanic membrane is not erythematous.   Left Ear: Tympanic membrane and external ear normal. Tympanic membrane is not erythematous.   Nose: No mucosal edema. Right sinus exhibits no frontal sinus  tenderness. Left sinus exhibits no frontal sinus tenderness. Mouth/Throat: Oropharynx is clear and moist and mucous membranes are normal. No oropharyngeal exudate.   Eyes: Conjunctivae and EOM are normal. Pupils are equal, round, and reactive to light. Right eye exhibits no discharge. Left eye exhibits no discharge.   Neck: Neck supple. No tracheal deviation present.   Normal range of motion.  Cardiovascular:  Normal rate and regular rhythm.           Pulmonary/Chest: Effort normal. No respiratory distress. He has decreased breath sounds in the right lower field and the left lower field. He has no wheezes. He has no rhonchi. He has no rales. He exhibits no tenderness.     Abdominal: There is no abdominal tenderness.   No bruising or ecchymosis over the abdominal wall.  Nonrigid.  No guarding or tenderness.    Musculoskeletal:         General: Normal range of motion.      Cervical back: Normal range of motion and neck supple.     Lymphadenopathy:        Head (right side): No submental adenopathy present.        Head (left side): No submental adenopathy present.     He has no cervical adenopathy.   Neurological: He is alert and oriented to person, place, and time. He has normal strength. No cranial nerve deficit. GCS score is 15. GCS eye subscore is 4. GCS verbal subscore is 5. GCS motor subscore is 6.   Skin: Skin is warm.   Psychiatric: He has a normal mood and affect. His behavior is normal. Judgment and thought content normal.         ED Course   Procedures  Labs Reviewed   COMPREHENSIVE METABOLIC PANEL - Abnormal       Result Value    Sodium 135 (*)     Potassium 4.5      Chloride 105      CO2 24      Glucose 91      Blood Urea Nitrogen 15.3      Creatinine 0.80      Calcium 9.3      Protein Total 7.8      Albumin 4.4      Globulin 3.4      Albumin/Globulin Ratio 1.3      Bilirubin Total 0.3      ALP 82      ALT 27      AST 18      eGFR >60      Anion Gap 6.0      BUN/Creatinine Ratio 19     URINALYSIS, REFLEX TO  URINE CULTURE - Abnormal    Color, UA Light-Yellow      Appearance, UA Clear      Specific Gravity, UA 1.019      pH, UA 7.5      Protein, UA Negative      Glucose, UA Normal      Ketones, UA Negative      Blood, UA Trace (*)     Bilirubin, UA Negative      Urobilinogen, UA Normal      Nitrites, UA Negative      Leukocyte Esterase, UA Negative      RBC, UA 6-10 (*)     WBC, UA 0-5      Bacteria, UA None Seen      Squamous Epithelial Cells, UA None Seen      Mucous, UA Trace (*)    CBC WITH DIFFERENTIAL - Abnormal    WBC 12.31 (*)     RBC 5.18      Hgb 15.1      Hct 46.3      MCV 89.4      MCH 29.2      MCHC 32.6 (*)     RDW 13.9      Platelet 278      MPV 9.2      Neut % 74.5      Lymph % 17.0      Mono % 7.6      Eos % 0.3      Basophil % 0.4      Imm Grans % 0.2      Neut # 9.17      Lymph # 2.09      Mono # 0.93      Eos # 0.04      Baso # 0.05      Imm Gran # 0.03      NRBC% 0.0     TROPONIN I - Normal    Troponin-I <0.010     B-TYPE NATRIURETIC PEPTIDE - Normal    Natriuretic Peptide <10.0     COVID/RSV/FLU A&B PCR - Normal    Influenza A PCR Not Detected      Influenza B PCR Not Detected      Respiratory Syncytial Virus PCR Not Detected      SARS-CoV-2 PCR Not Detected      Narrative:     The Xpert Xpress SARS-CoV-2/FLU/RSV plus is a rapid, multiplexed real-time PCR test intended for the simultaneous qualitative detection and differentiation of SARS-CoV-2, Influenza A, Influenza B, and respiratory syncytial virus (RSV) viral RNA in either nasopharyngeal swab or nasal swab specimens.         CBC W/ AUTO DIFFERENTIAL    Narrative:     The following orders were created for panel order CBC Auto Differential.  Procedure                               Abnormality         Status                     ---------                               -----------         ------                     CBC with Differential[[id number]]        Abnormal            Final result                 Please view results for these tests on the  individual orders.        ECG Results              EKG 12-lead (Final result)        Collection Time Result Time QRS Duration OHS QTC Calculation    04/15/25 12:54:54 04/15/25 13:52:20 90 385                     Final result by Interface, [facility] (04/15/25 13:52:26)                   Narrative:    Test Reason : R06.02,    Vent. Rate :  69 BPM     Atrial Rate :  69 BPM     P-R Int : 144 ms          QRS Dur :  90 ms      QT Int : 360 ms       P-R-T Axes :  74 102  70 degrees    QTcB Int : 385 ms    Normal sinus rhythm  Rightward axis  Borderline Abnormal ECG  No previous ECGs available  Confirmed by Pedrito Ryan ([id number]) on 4/15/2025 1:52:18 PM    Referred By:            Confirmed By: Pedrito Ryan                                  Imaging Results              CT Chest Abdomen Pelvis With IV Contrast (XPD) NO Oral Contrast (Final result)  Result time 04/15/25 17:06:02      Final result by Jackie Hines MD (04/15/25 17:06:02)                   Impression:      Right renal injury with subcapsular hematoma and small retroperitoneal hemorrhage.  No evidence of active bleeding.      Electronically signed by: Jackie Hines  Date:    04/15/2025  Time:    17:06               Narrative:    EXAMINATION:  CT CHEST ABDOMEN PELVIS WITH IV CONTRAST (XPD)    CLINICAL HISTORY:  Polytrauma, blunt;    TECHNIQUE:  CT of the chest, abdomen and pelvis WITH intravenous contrast. The chest, abdomen and pelvis were scanned utilizing a multidetector helical scanner from the lung apex to the lesser trochanter after the administration of intravenous contrast.    Coronal and sagittal reconstructions were obtained from the axial data set.    Automatic exposure control was utilized to limit radiation dose.    Radiation Dose:    Total DLP: 327 mGy*cm    COMPARISON:  None    FINDINGS:  LINES/TUBES: None.    AIRWAYS: Clear centrally.    LUNGS: Bibasilar subsegmental atelectasis.    PLEURA: No pleural effusions. No pneumothoraces.    HEART  AND MEDIASTINUM: The heart is normal in size.  There is no pericardial effusion.  There is no evidence of a thoracic aortic injury.    SOFT TISSUES: Normal.    THORACIC BONES: No acute bony pathology.    ABDOMEN/PELVIS:    HEPATOBILIARY: No evidence of acute injury.    SPLEEN: No evidence of acute injury.    PANCREAS: Unremarkable.    ADRENALS: No adrenal nodules.    KIDNEYS/URETERS: There is a right renal subcapsular hematoma measuring up to 1.7 cm in thickness.  There is no definite visible renal laceration.  There is no hydronephrosis.    PELVIC ORGANS/BLADDER: Unremarkable.    PERITONEUM/RETROPERITONEUM: Small right retroperitoneal hemorrhage.    LYMPH NODES: No lymphadenopathy.    VESSELS: Unremarkable.    GI TRACT: No distention or wall thickening.    ABDOMINOPELVIC BONES AND SOFT TISSUE: Soft tissues within normal limits. No acute bony pathology.                                       X-Ray Chest PA And Lateral (Final result)  Result time 04/15/25 13:39:19      Final result by Logan Smith MD (04/15/25 13:39:19)                   Impression:      No acute cardiopulmonary abnormality.      Electronically signed by: Logan Smith  Date:    04/15/2025  Time:    13:39               Narrative:    EXAMINATION:  XR CHEST PA AND LATERAL    CLINICAL HISTORY:  SOB;    COMPARISON:  8 March 2025    FINDINGS:  PA and lateral views of the chest were obtained. Heart and mediastinum within normal limits. The lungs are clear. No pneumothorax or significant effusion.                                       Medications   morphine injection 4 mg (4 mg Intravenous Given 4/15/25 1629)   ondansetron injection 4 mg (4 mg Intravenous Given 4/15/25 1629)   iohexoL (OMNIPAQUE 350) injection 100 mL (100 mLs Intravenous Given 4/15/25 1649)     Medical Decision Making  35yoHM w/no PMH presents to the emergency department with fever for the last 24 hours.  Patient is also having worsening chest and right flank pain over the last 3-4 days  since she was discharged from this hospital.  Patient was seen initially on 04/11/2025 for fall from a roof at approximately 10 ft.  He had a small laceration to the right kidney at that time. He was admitted for further evaluation. TMAX 100.7F at home.     Problems Addressed:  Fever, unspecified fever cause: acute illness or injury     Details: Differential diagnosis included but not limited to:  Pneumonia, infected hematoma, viral infection, other etiologies      Shortness of breath: acute illness or injury     Details: Differential diagnosis included but not limited to:  Rib fracture, hematoma of the abdomen or chest, pneumonia, other etiologies    Hematoma has not changed in size. No acute findings on imaging. Discussed with BROOKLYN Bryant w/trauma services who recommends pain medicine. No antibiotics recommended at this time. Encouraged incentive spirometry at home.  All questions asked and answered at the time of the visit. Strict ER precautions given. Patient and family members agreeable to plan.     Amount and/or Complexity of Data Reviewed  Independent Historian:      Details: COUSIN   Labs: ordered. Decision-making details documented in ED Course.  Radiology: ordered. Decision-making details documented in ED Course.    Risk  Prescription drug management.               ED Course as of 04/15/25 1758 Tue Apr 15, 2025   1436 BNP: <10.0 [ST]   1436 Troponin I: <0.010 [ST]   1436 WBC(!): 12.31 [ST]   1436 CXR     Impression:     No acute cardiopulmonary abnormality.        Electronically signed by:Logan Smith  Date:                                            04/15/2025  Time:                                           13:39      Exam Ended: 04/15/25 13:37 CDT Last Resulted: 04/15/25 13:39 CDT [ST]   1554 Blood, UA(!): Trace [ST]   1554 RBC, UA(!): 6-10 [ST]   1720 Waiting on trauma to call us back  [ST]      ED Course User Index  [ST] Darlene Perez PA                           Clinical  Impression:  Final diagnoses:  [R06.02] Shortness of breath  [R50.9] Fever, unspecified fever cause (Primary)  [R10.9] Side pain          ED Disposition Condition    Discharge Stable          ED Prescriptions       Medication Sig Dispense Start Date End Date Auth. Provider    HYDROcodone-acetaminophen (NORCO)  mg per tablet Take 1 tablet by mouth every 6 (six) hours as needed for Pain. 12 tablet 4/15/2025 -- Darlene Perez PA          Follow-up Information       Follow up With Specialties Details Why Contact Info    [facility] - Emergency Dept Emergency Medicine  As needed, If symptoms worsen [address] [facility] [phone]    [facility] Trauma Surgery Schedule an appointment as soon as possible for a visit in 1 week(s) We will call you for an appointment next week. [address] [place] [address] [address] [place] [phone]        This note was typed partially using voice recognition software.  Please be reminded that not all corrections/addendums to grammar may have been made prior to closing of this chart.         [1]   Social History  Tobacco Use    Smoking status: Never    Smokeless tobacco: Never   Substance Use Topics    Alcohol use: Never    Drug use: Never        Darlene Perez PA  04/15/25 [id number]

## 2025-04-15 NOTE — CONSULTS
Trauma Surgery   Consult    Patient Name: Poncho Taylor  YOB: 1990  Date: 04/15/2025 6:02 PM  Date of Admission: 4/15/2025  HD#0  POD#* No surgery found *    PRESENTING HISTORY   Chief Complaint/Reason for Admission: Kidney laceration, right, subsequent encounter     History of Present Illness:  Patient is a 35 year old Chinese speaking male who presents for evaluation of continued right flank pain.  at bedside utilized for evaluation of patient. He was initially evaluated and admitted on 4/11/25 following fall from height sustaining renal laceration. He was admitted for observation, had stable H&H and was discharged home 4/12/25. He presents today for continued pain and one fever at home yesterday. Labs and imaging reviewed. Stable findings, no active bleeding.     Review of Systems:  12 point ROS negative except as stated in HPI    PAST HISTORY:   Past medical history:  No past medical history on file.    Past surgical history:  No past surgical history on file.    Family history:  No family history on file.    Social history:  Social History     Socioeconomic History    Marital status: Single   Tobacco Use    Smoking status: Never    Smokeless tobacco: Never   Substance and Sexual Activity    Alcohol use: Never    Drug use: Never     Tobacco Use History[1]   Social History     Substance and Sexual Activity   Alcohol Use Never        MEDICATIONS & ALLERGIES:   Allergies: Review of patient's allergies indicates:  No Known Allergies  Home Meds:   Current Outpatient Medications   Medication Instructions    benzonatate (TESSALON) 100 mg, Oral, 3 times daily PRN    fluticasone propionate (FLONASE) 50 mcg, Each Nostril, 2 times daily PRN    HYDROcodone-acetaminophen (NORCO)  mg per tablet 1 tablet, Oral, Every 6 hours PRN    ibuprofen (ADVIL,MOTRIN) 600 mg, Oral, Every 6 hours PRN    loratadine (CLARITIN) 10 mg, Oral, Daily PRN    Medications Ordered Prior to Encounter[2]  "  No current facility-administered medications on file prior to encounter.     Current Outpatient Medications on File Prior to Encounter   Medication Sig    benzonatate (TESSALON) 100 MG capsule Take 1 capsule (100 mg total) by mouth 3 (three) times daily as needed for Cough.    fluticasone propionate (FLONASE) 50 mcg/actuation nasal spray 1 spray (50 mcg total) by Each Nostril route 2 (two) times daily as needed for Rhinitis or Allergies.    ibuprofen (ADVIL,MOTRIN) 600 MG tablet Take 1 tablet (600 mg total) by mouth every 6 (six) hours as needed for Pain.    loratadine (CLARITIN) 10 mg tablet Take 1 tablet (10 mg total) by mouth daily as needed for Allergies.     Scheduled Meds:  Continuous Infusions:  PRN Meds:    OBJECTIVE:   Vital Signs:  VITAL SIGNS: 24 HR MIN & MAX LAST   Temp  Min: 99 °F (37.2 °C)  Max: 99 °F (37.2 °C)  99 °F (37.2 °C)   BP  Min: 129/81  Max: 131/94  (!) 131/94    Pulse  Min: 59  Max: 65  (!) 59    Resp  Min: 20  Max: 20  20    SpO2  Min: 95 %  Max: 98 %  95 %      HT: 5' 6" (167.6 cm)  WT: 76.2 kg (168 lb)  BMI: 27.1   Intake/output: No intake/output data recorded.     Lines/drains/airway:       Peripheral IV - Single Lumen 04/15/25 1626 20 G Anterior;Distal;Right Upper Arm (Active)   Site Assessment Clean;Dry;Intact;No redness;No swelling 04/15/25 1626   Line Securement Device Secured with sutureless device 04/15/25 1626   Extremity Assessment Distal to IV No abnormal discoloration;No redness;No swelling 04/15/25 1626   Line Status Blood return noted;Flushed;Saline locked 04/15/25 1626   Dressing Status Clean;Dry;Intact 04/15/25 1626   Dressing Intervention First dressing 04/15/25 1626   Number of days: 0       Physical Exam:  General:  Well developed, well nourished, no acute distress  HEENT:  Normocephalic, atraumatic  CV:  RR  Resp/chest: NWOB  GI:  Abdomen soft, non-tender, non-distended  :  Right flank pain  MSK:  No muscle atrophy, cyanosis, peripheral edema, moving all extremities " spontaneously  Neuro: GCS 15. CNII-XII grossly intact, alert and oriented to person, place, and time. Strength and motor function grossly intact to all extremities, sensation intact to all extremities.  Skin/Wounds:  warm, dry    Labs:  Troponin:  Recent Labs     04/15/25  1308   TROPONINI <0.010     CBC:  Recent Labs     04/15/25  1308   WBC 12.31*   RBC 5.18   HGB 15.1   HCT 46.3      MCV 89.4   MCH 29.2   MCHC 32.6*     CMP:  Recent Labs     04/15/25  1308   CALCIUM 9.3   ALBUMIN 4.4   *   K 4.5   CO2 24      BUN 15.3   CREATININE 0.80   ALKPHOS 82   ALT 27   AST 18   BILITOT 0.3       I have reviewed all pertinent lab results within the past 24 hours.    Diagnostic Results:  Imaging Results              CT Chest Abdomen Pelvis With IV Contrast (XPD) NO Oral Contrast (Final result)  Result time 04/15/25 17:06:02      Final result by Jackie Hines MD (04/15/25 17:06:02)                   Impression:      Right renal injury with subcapsular hematoma and small retroperitoneal hemorrhage.  No evidence of active bleeding.      Electronically signed by: Jackie Hines  Date:    04/15/2025  Time:    17:06               Narrative:    EXAMINATION:  CT CHEST ABDOMEN PELVIS WITH IV CONTRAST (XPD)    CLINICAL HISTORY:  Polytrauma, blunt;    TECHNIQUE:  CT of the chest, abdomen and pelvis WITH intravenous contrast. The chest, abdomen and pelvis were scanned utilizing a multidetector helical scanner from the lung apex to the lesser trochanter after the administration of intravenous contrast.    Coronal and sagittal reconstructions were obtained from the axial data set.    Automatic exposure control was utilized to limit radiation dose.    Radiation Dose:    Total DLP: 327 mGy*cm    COMPARISON:  None    FINDINGS:  LINES/TUBES: None.    AIRWAYS: Clear centrally.    LUNGS: Bibasilar subsegmental atelectasis.    PLEURA: No pleural effusions. No pneumothoraces.    HEART AND MEDIASTINUM: The heart is  normal in size.  There is no pericardial effusion.  There is no evidence of a thoracic aortic injury.    SOFT TISSUES: Normal.    THORACIC BONES: No acute bony pathology.    ABDOMEN/PELVIS:    HEPATOBILIARY: No evidence of acute injury.    SPLEEN: No evidence of acute injury.    PANCREAS: Unremarkable.    ADRENALS: No adrenal nodules.    KIDNEYS/URETERS: There is a right renal subcapsular hematoma measuring up to 1.7 cm in thickness.  There is no definite visible renal laceration.  There is no hydronephrosis.    PELVIC ORGANS/BLADDER: Unremarkable.    PERITONEUM/RETROPERITONEUM: Small right retroperitoneal hemorrhage.    LYMPH NODES: No lymphadenopathy.    VESSELS: Unremarkable.    GI TRACT: No distention or wall thickening.    ABDOMINOPELVIC BONES AND SOFT TISSUE: Soft tissues within normal limits. No acute bony pathology.                                       X-Ray Chest PA And Lateral (Final result)  Result time 04/15/25 13:39:19      Final result by Logan Smith MD (04/15/25 13:39:19)                   Impression:      No acute cardiopulmonary abnormality.      Electronically signed by: Logan Smith  Date:    04/15/2025  Time:    13:39               Narrative:    EXAMINATION:  XR CHEST PA AND LATERAL    CLINICAL HISTORY:  SOB;    COMPARISON:  8 March 2025    FINDINGS:  PA and lateral views of the chest were obtained. Heart and mediastinum within normal limits. The lungs are clear. No pneumothorax or significant effusion.                                     I have reviewed all pertinent imaging results/findings within the past 24 hours.    ASSESSMENT & PLAN:      Patient is a 35 year old male who presented for evaluation of continued flank pain and a one time fever at home yesterday. Labs and imaging reviewed with Dr. Serrato. No need for trauma admission. Ok for discharge home with pain medications. Will setup follow up in trauma clinic for next week. Patient and family instructed to call trauma clinic with  any questions or concerns. Plan of care discussed with ED provider. Trauma surgery will sign off. Please reconsult as needed.    Barb Samayoa, AGACNP-BC, FNP-BC  Trauma Surgery  [facility]  C: [phone]         [1]   Social History  Tobacco Use   Smoking Status Never   Smokeless Tobacco Never   [2]   No current facility-administered medications on file prior to encounter.     Current Outpatient Medications on File Prior to Encounter   Medication Sig Dispense Refill    benzonatate (TESSALON) 100 MG capsule Take 1 capsule (100 mg total) by mouth 3 (three) times daily as needed for Cough. 15 capsule 0    fluticasone propionate (FLONASE) 50 mcg/actuation nasal spray 1 spray (50 mcg total) by Each Nostril route 2 (two) times daily as needed for Rhinitis or Allergies. 15 g 0    ibuprofen (ADVIL,MOTRIN) 600 MG tablet Take 1 tablet (600 mg total) by mouth every 6 (six) hours as needed for Pain. 20 tablet 0    loratadine (CLARITIN) 10 mg tablet Take 1 tablet (10 mg total) by mouth daily as needed for Allergies. 20 tablet 0

## 2025-04-16 ENCOUNTER — TELEPHONE (OUTPATIENT)
Facility: CLINIC | Age: 35
End: 2025-04-16

## 2025-04-16 NOTE — TELEPHONE ENCOUNTER
Attempted to contact the pt using the language line making him aware to come to the trauma clinic on 4/22 no answer LVM

## 2025-04-24 ENCOUNTER — OFFICE VISIT (OUTPATIENT)
Facility: CLINIC | Age: 35
End: 2025-04-24

## 2025-04-24 ENCOUNTER — TELEPHONE (OUTPATIENT)
Facility: CLINIC | Age: 35
End: 2025-04-24

## 2025-04-24 VITALS
SYSTOLIC BLOOD PRESSURE: 152 MMHG | HEART RATE: 63 BPM | OXYGEN SATURATION: 97 % | TEMPERATURE: 98 F | DIASTOLIC BLOOD PRESSURE: 87 MMHG

## 2025-04-24 DIAGNOSIS — R07.89 CHEST PAIN, MUSCULOSKELETAL: ICD-10-CM

## 2025-04-24 DIAGNOSIS — S37.031D: ICD-10-CM

## 2025-04-24 DIAGNOSIS — R20.0 RIGHT ARM NUMBNESS: ICD-10-CM

## 2025-04-24 DIAGNOSIS — R51.9 NONINTRACTABLE EPISODIC HEADACHE, UNSPECIFIED HEADACHE TYPE: Primary | ICD-10-CM

## 2025-04-24 PROCEDURE — 99213 OFFICE O/P EST LOW 20 MIN: CPT | Mod: PBBFAC

## 2025-04-24 PROCEDURE — 99213 OFFICE O/P EST LOW 20 MIN: CPT | Mod: S$PBB,,, | Performed by: NURSE PRACTITIONER

## 2025-04-24 PROCEDURE — 99999 PR PBB SHADOW E&M-EST. PATIENT-LVL III: CPT | Mod: PBBFAC,,,

## 2025-04-24 RX ORDER — METHOCARBAMOL 500 MG/1
500 TABLET, FILM COATED ORAL 4 TIMES DAILY PRN
Qty: 40 TABLET | Refills: 0 | Status: SHIPPED | OUTPATIENT
Start: 2025-04-24 | End: 2025-05-01

## 2025-04-24 RX ORDER — KETOROLAC TROMETHAMINE 10 MG/1
10 TABLET, FILM COATED ORAL EVERY 6 HOURS
Qty: 20 TABLET | Refills: 0 | Status: SHIPPED | OUTPATIENT
Start: 2025-04-24 | End: 2025-04-29

## 2025-04-24 NOTE — ASSESSMENT & PLAN NOTE
From: Keira Alfred  To: Jud Deal Toradol and Robaxin called in. Deep breathing. Gentle exercise.

## 2025-04-24 NOTE — PROGRESS NOTES
Trauma Clinic Note  Subjective:   Patient is a 35 year old male who presents with fall from roof.  He complains intermittent and resolved with medication but returns location reactive.  He is also having intermittent right arm numbness.  There were no provoking symptoms.  He denies weakness.  His sensation is currently intact and he has a 2+ right radial pulse.  No external signs of trauma.  No posterior cervical pain.  There was torticollis with the initial CT scan.  He also complains of right lateral chest and posterior chest pain.  Mildly tender to palpation.  No external signs of trauma.  He is out of pain medication at this time.  He did have return to the emergency department since his index admission and was discharged after evaluation by trauma service..    Vitals:    04/24/25 1311   BP: (!) 152/87   Pulse: 63   Temp:            Past medical history:  No past medical history on file.  Past surgical history:  No past surgical history on file.  Family history:  No family history on file.  Social history:  Social History     Socioeconomic History    Marital status: Single   Tobacco Use    Smoking status: Every Day     Current packs/day: 0.25     Average packs/day: 0.3 packs/day for 10.3 years (2.6 ttl pk-yrs)     Types: Cigarettes     Start date: 2015    Smokeless tobacco: Never   Substance and Sexual Activity    Alcohol use: Never    Drug use: Never     Tobacco Use History[1]   Social History     Substance and Sexual Activity   Alcohol Use Never      Allergies: Review of patient's allergies indicates:  No Known Allergies  Home Meds:   Current Outpatient Medications   Medication Instructions    benzonatate (TESSALON) 100 mg, Oral, 3 times daily PRN    fluticasone propionate (FLONASE) 50 mcg, Each Nostril, 2 times daily PRN    HYDROcodone-acetaminophen (NORCO)  mg per tablet 1 tablet, Oral, Every 6 hours PRN    ibuprofen (ADVIL,MOTRIN) 600 mg, Oral, Every 6 hours PRN    loratadine (CLARITIN) 10 mg,  Oral, Daily PRN    Medications Ordered Prior to Encounter[2]   Current Outpatient Medications on File Prior to Visit   Medication Sig    HYDROcodone-acetaminophen (NORCO)  mg per tablet Take 1 tablet by mouth every 6 (six) hours as needed for Pain.    benzonatate (TESSALON) 100 MG capsule Take 1 capsule (100 mg total) by mouth 3 (three) times daily as needed for Cough. (Patient not taking: Reported on 4/24/2025)    fluticasone propionate (FLONASE) 50 mcg/actuation nasal spray 1 spray (50 mcg total) by Each Nostril route 2 (two) times daily as needed for Rhinitis or Allergies. (Patient not taking: Reported on 4/24/2025)    ibuprofen (ADVIL,MOTRIN) 600 MG tablet Take 1 tablet (600 mg total) by mouth every 6 (six) hours as needed for Pain. (Patient not taking: Reported on 4/24/2025)    loratadine (CLARITIN) 10 mg tablet Take 1 tablet (10 mg total) by mouth daily as needed for Allergies. (Patient not taking: Reported on 4/24/2025)     No current facility-administered medications on file prior to visit.        ROS  Negative unless previously mentioned.     Objective:   Gen: NAD  CV: RR, 2+ RP b/l, 2+ DP b/l   Resp: NWOB, mild tenderness to right lateral chest wall.  Abd:  Soft, nontender, nondistended.  No external signs of trauma.  MSK:  Moving all 4 extremities.  5/5 strength.  Neuro: GCS 15, CN 2-12 grossly intact   Skin/wound:  As above.    Assessment:  Problem List[3]   Active Problem List with Overview Notes    Diagnosis Date Noted    Nonintractable episodic headache 04/24/2025    Right arm numbness 04/24/2025    Chest pain, musculoskeletal 04/24/2025    Kidney laceration, right, subsequent encounter 04/15/2025      1. Nonintractable episodic headache, unspecified headache type  CT Head Without Contrast      2. Right arm numbness        3. Chest pain, musculoskeletal        4. Kidney laceration, right, subsequent encounter            Plan:  Nonintractable episodic headache, unspecified headache  type  Assessment & Plan:  Likely post-concussive. Patient very concerned as HA is not resolving and is relatively persistent if not on pain medicines. Does have some slowed cognition. No CT head on either visit. CT head as able, will need workmans comp approval prior. Patient aware of plan. Return for this only if CT abnormal.     Orders:  -     CT Head Without Contrast; Future; Expected date: 04/24/2025    Right arm numbness  Assessment & Plan:  Torticollis on initial CT. No neck pain/tenderness. PT is next step. If in 4-6 weeks, fails treatment, obtain MRI cervical without contrast. We will call patient in 4-6 weeks to check progress and decide on MRI vs continued medical management.       Chest pain, musculoskeletal  Assessment & Plan:  Toradol and Robaxin called in. Deep breathing. Gentle exercise.       Kidney laceration, right, subsequent encounter  Assessment & Plan:  No dysuria. Follow up PRN. Stay hydrated.           - ED precautions given  - Follow up PCP   - Return PRN, no scheduled appointment needed. Call for concerns.       [1]   Social History  Tobacco Use   Smoking Status Every Day    Current packs/day: 0.25    Average packs/day: 0.3 packs/day for 10.3 years (2.6 ttl pk-yrs)    Types: Cigarettes    Start date: 2015   Smokeless Tobacco Never   [2]   Current Outpatient Medications on File Prior to Visit   Medication Sig Dispense Refill    HYDROcodone-acetaminophen (NORCO)  mg per tablet Take 1 tablet by mouth every 6 (six) hours as needed for Pain. 12 tablet 0    benzonatate (TESSALON) 100 MG capsule Take 1 capsule (100 mg total) by mouth 3 (three) times daily as needed for Cough. (Patient not taking: Reported on 4/24/2025) 15 capsule 0    fluticasone propionate (FLONASE) 50 mcg/actuation nasal spray 1 spray (50 mcg total) by Each Nostril route 2 (two) times daily as needed for Rhinitis or Allergies. (Patient not taking: Reported on 4/24/2025) 15 g 0    ibuprofen (ADVIL,MOTRIN) 600 MG tablet Take  1 tablet (600 mg total) by mouth every 6 (six) hours as needed for Pain. (Patient not taking: Reported on 4/24/2025) 20 tablet 0    loratadine (CLARITIN) 10 mg tablet Take 1 tablet (10 mg total) by mouth daily as needed for Allergies. (Patient not taking: Reported on 4/24/2025) 20 tablet 0     No current facility-administered medications on file prior to visit.   [3]   Patient Active Problem List  Diagnosis    Kidney laceration, right, subsequent encounter    Nonintractable episodic headache    Right arm numbness    Chest pain, musculoskeletal

## 2025-04-24 NOTE — ASSESSMENT & PLAN NOTE
Torticollis on initial CT. No neck pain/tenderness. PT is next step. If in 4-6 weeks, fails treatment, obtain MRI cervical without contrast. We will call patient in 4-6 weeks to check progress and decide on MRI vs continued medical management.

## 2025-04-24 NOTE — ASSESSMENT & PLAN NOTE
Likely post-concussive. Patient very concerned as HA is not resolving and is relatively persistent if not on pain medicines. Does have some slowed cognition. No CT head on either visit. CT head as able, will need workmans comp approval prior. Patient aware of plan. Return for this only if CT abnormal.

## 2025-04-24 NOTE — TELEPHONE ENCOUNTER
This patient was seen in the Outpatient Trauma Clinic today, a  was used for the visit. The family at the bedside states the patient has Workmans Comp. I ask her for the  information and she states she does not currently have the information but will send it to us. I provide her with the clinic contact information, phone and fax numbers.

## 2025-04-28 ENCOUNTER — HOSPITAL ENCOUNTER (OUTPATIENT)
Dept: RADIOLOGY | Facility: HOSPITAL | Age: 35
Discharge: HOME | End: 2025-04-28
Attending: NURSE PRACTITIONER

## 2025-04-28 DIAGNOSIS — R51.9 NONINTRACTABLE EPISODIC HEADACHE, UNSPECIFIED HEADACHE TYPE: ICD-10-CM

## 2025-04-28 PROCEDURE — 70450 CT HEAD/BRAIN W/O DYE: CPT | Mod: TC

## 2025-04-30 DIAGNOSIS — M54.2 CERVICAL PAIN (NECK): ICD-10-CM

## 2025-04-30 DIAGNOSIS — R29.898 RIGHT ARM WEAKNESS: Primary | ICD-10-CM

## 2025-04-30 DIAGNOSIS — R20.0 RIGHT ARM NUMBNESS: ICD-10-CM

## 2025-04-30 DIAGNOSIS — M54.2 NECK PAIN: ICD-10-CM

## 2025-05-08 ENCOUNTER — TELEPHONE (OUTPATIENT)
Facility: CLINIC | Age: 35
End: 2025-05-08

## 2025-05-12 DIAGNOSIS — R20.0 RIGHT ARM NUMBNESS: ICD-10-CM

## 2025-05-12 DIAGNOSIS — R29.898 RIGHT ARM WEAKNESS: Primary | ICD-10-CM

## 2025-05-12 DIAGNOSIS — M54.2 CERVICAL PAIN (NECK): ICD-10-CM

## 2025-06-23 ENCOUNTER — TELEPHONE (OUTPATIENT)
Facility: CLINIC | Age: 35
End: 2025-06-23
Payer: OTHER MISCELLANEOUS